# Patient Record
Sex: FEMALE | NOT HISPANIC OR LATINO | URBAN - METROPOLITAN AREA
[De-identification: names, ages, dates, MRNs, and addresses within clinical notes are randomized per-mention and may not be internally consistent; named-entity substitution may affect disease eponyms.]

---

## 2017-07-14 ENCOUNTER — EMERGENCY (EMERGENCY)
Facility: HOSPITAL | Age: 37
LOS: 1 days | End: 2017-07-14
Payer: COMMERCIAL

## 2017-07-14 PROCEDURE — 74177 CT ABD & PELVIS W/CONTRAST: CPT | Mod: 26

## 2017-07-14 PROCEDURE — 76705 ECHO EXAM OF ABDOMEN: CPT | Mod: 26

## 2017-07-14 PROCEDURE — 99285 EMERGENCY DEPT VISIT HI MDM: CPT

## 2017-07-16 ENCOUNTER — TRANSCRIPTION ENCOUNTER (OUTPATIENT)
Age: 37
End: 2017-07-16

## 2017-10-17 ENCOUNTER — OUTPATIENT (OUTPATIENT)
Dept: OUTPATIENT SERVICES | Facility: HOSPITAL | Age: 37
LOS: 1 days | End: 2017-10-17

## 2017-10-24 ENCOUNTER — OUTPATIENT (OUTPATIENT)
Dept: OUTPATIENT SERVICES | Facility: HOSPITAL | Age: 37
LOS: 1 days | End: 2017-10-24
Payer: COMMERCIAL

## 2017-10-24 PROCEDURE — 74220 X-RAY XM ESOPHAGUS 1CNTRST: CPT | Mod: 26

## 2018-05-02 ENCOUNTER — OUTPATIENT (OUTPATIENT)
Dept: OUTPATIENT SERVICES | Facility: HOSPITAL | Age: 38
LOS: 1 days | End: 2018-05-02

## 2018-05-08 ENCOUNTER — INPATIENT (INPATIENT)
Facility: HOSPITAL | Age: 38
LOS: 1 days | Discharge: HOME | End: 2018-05-10
Payer: COMMERCIAL

## 2018-05-09 PROCEDURE — 74220 X-RAY XM ESOPHAGUS 1CNTRST: CPT | Mod: 26

## 2020-03-16 ENCOUNTER — APPOINTMENT (OUTPATIENT)
Dept: RADIOLOGY | Facility: CLINIC | Age: 40
End: 2020-03-16
Payer: COMMERCIAL

## 2020-03-16 PROCEDURE — 73502 X-RAY EXAM HIP UNI 2-3 VIEWS: CPT | Mod: LT

## 2020-03-16 PROCEDURE — 72074 X-RAY EXAM THORAC SPINE4/>VW: CPT

## 2020-03-16 PROCEDURE — 72050 X-RAY EXAM NECK SPINE 4/5VWS: CPT

## 2020-03-16 PROCEDURE — 72110 X-RAY EXAM L-2 SPINE 4/>VWS: CPT

## 2020-03-19 ENCOUNTER — APPOINTMENT (OUTPATIENT)
Dept: MRI IMAGING | Facility: CLINIC | Age: 40
End: 2020-03-19
Payer: COMMERCIAL

## 2020-03-19 PROCEDURE — 72148 MRI LUMBAR SPINE W/O DYE: CPT

## 2020-03-23 PROBLEM — Z00.00 ENCOUNTER FOR PREVENTIVE HEALTH EXAMINATION: Status: ACTIVE | Noted: 2020-03-23

## 2020-06-23 ENCOUNTER — APPOINTMENT (OUTPATIENT)
Dept: NEUROSURGERY | Facility: CLINIC | Age: 40
End: 2020-06-23
Payer: COMMERCIAL

## 2020-06-23 VITALS
SYSTOLIC BLOOD PRESSURE: 139 MMHG | HEIGHT: 68.5 IN | BODY MASS INDEX: 43.45 KG/M2 | DIASTOLIC BLOOD PRESSURE: 88 MMHG | HEART RATE: 87 BPM | WEIGHT: 290 LBS

## 2020-06-23 DIAGNOSIS — Z78.9 OTHER SPECIFIED HEALTH STATUS: ICD-10-CM

## 2020-06-23 DIAGNOSIS — M51.37 OTHER INTERVERTEBRAL DISC DEGENERATION, LUMBOSACRAL REGION: ICD-10-CM

## 2020-06-23 DIAGNOSIS — Z82.49 FAMILY HISTORY OF ISCHEMIC HEART DISEASE AND OTHER DISEASES OF THE CIRCULATORY SYSTEM: ICD-10-CM

## 2020-06-23 PROCEDURE — 99204 OFFICE O/P NEW MOD 45 MIN: CPT

## 2020-06-23 RX ORDER — OMEPRAZOLE 20 MG/1
20 CAPSULE, DELAYED RELEASE ORAL
Refills: 0 | Status: ACTIVE | COMMUNITY

## 2020-06-23 NOTE — PLAN
[FreeTextEntry1] : \par DIAGNOSIS:  DISK DEGENERATION  \par TREATMENT PLAN:  NON-SURGICAL\par \par This is a patient with a degenerated lumbar and thoracic  disk. In reference to this thoracic subluxation. This is also simply this degeneration with a mild malalignment. It's likely due to her body habitus. She's got enhance kyphosis and this is secondarily related to her chest size.  We discussed this in detail and she does understand. This really no room for any surgical intervention.  I have recommended nonsurgical management at this time.  This consists of physical therapy and/or manual medicine in conjunction to medical therapy and other conservative methods.  These include the consideration of trigger point injections and or the utilization of modalities such as TENS where applicable.  The next tier would be the referral to a pain management specialist (anesthesia or physiatry) for the consideration of spinal injections.  This includes the options of epidural steroids, facet injections as well as other novel techniques that may provide pain relief.  Although there is indeed evidence of disk degeneration on imaging, discectomy or spinal fusion for the sole purposes of treating back/leg pain in the absence of a compressive lesion or neurological issue is associated with poor outcomes.   \par \par I have reviewed the images in detail with the patient today in my office and have answered all questions regarding this condition to the best of my ability to the patient’s satisfaction.\par

## 2020-06-23 NOTE — REASON FOR VISIT
[New Patient Visit] : a new patient visit [Referred By: _________] : Patient was referred by YULIA [FreeTextEntry1] : Subluxation thoracic spine- mri done at [facility].
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
## 2020-06-23 NOTE — PHYSICAL EXAM
[Normal] : normal [Able to toe walk] : the patient was able to toe walk [Able to heel walk] : the patient was able to heel walk [Intact] : all sensory within normal limits bilaterally

## 2020-06-23 NOTE — RESULTS/DATA
[FreeTextEntry1] : \par [facility] MRI reveals mild degeneration of the discs without any stenosis or herniated discs of significance. Cervical spine MRI done at [facility] shows no evidence of significant disc herniation or stenosis. Thoracic MRI done at [facility] shows enhanced kyphosis in the mid thoracic level. There is note of T8-T9 subluxation however it's basically a area of this degeneration with advanced arthrosis rather than a suggestion of any instability.  Flexion extension x-rays in the office confirmed that there is no instability.

## 2020-12-08 ENCOUNTER — OUTPATIENT (OUTPATIENT)
Dept: OUTPATIENT SERVICES | Facility: HOSPITAL | Age: 40
LOS: 1 days | End: 2020-12-08

## 2020-12-08 ENCOUNTER — INPATIENT (INPATIENT)
Facility: HOSPITAL | Age: 40
LOS: 6 days | Discharge: HOME | End: 2020-12-15
Payer: COMMERCIAL

## 2020-12-08 PROCEDURE — 93010 ELECTROCARDIOGRAM REPORT: CPT

## 2020-12-08 PROCEDURE — 71045 X-RAY EXAM CHEST 1 VIEW: CPT | Mod: 26,76

## 2020-12-08 PROCEDURE — 99284 EMERGENCY DEPT VISIT MOD MDM: CPT

## 2020-12-08 PROCEDURE — 32555 ASPIRATE PLEURA W/ IMAGING: CPT | Mod: LT

## 2020-12-08 PROCEDURE — 71275 CT ANGIOGRAPHY CHEST: CPT | Mod: 26

## 2020-12-08 PROCEDURE — 74177 CT ABD & PELVIS W/CONTRAST: CPT | Mod: 26

## 2020-12-09 ENCOUNTER — OUTPATIENT (OUTPATIENT)
Dept: OUTPATIENT SERVICES | Facility: HOSPITAL | Age: 40
LOS: 1 days | End: 2020-12-09

## 2020-12-09 PROCEDURE — 71045 X-RAY EXAM CHEST 1 VIEW: CPT | Mod: 26

## 2020-12-09 PROCEDURE — 71250 CT THORAX DX C-: CPT | Mod: 26

## 2020-12-10 ENCOUNTER — OUTPATIENT (OUTPATIENT)
Dept: OUTPATIENT SERVICES | Facility: HOSPITAL | Age: 40
LOS: 1 days | End: 2020-12-10

## 2020-12-10 ENCOUNTER — RESULT REVIEW (OUTPATIENT)
Age: 40
End: 2020-12-10

## 2020-12-10 PROCEDURE — 71045 X-RAY EXAM CHEST 1 VIEW: CPT | Mod: 26

## 2020-12-11 ENCOUNTER — OUTPATIENT (OUTPATIENT)
Dept: OUTPATIENT SERVICES | Facility: HOSPITAL | Age: 40
LOS: 1 days | End: 2020-12-11

## 2020-12-11 PROCEDURE — 93010 ELECTROCARDIOGRAM REPORT: CPT

## 2020-12-12 ENCOUNTER — APPOINTMENT (OUTPATIENT)
Dept: THORACIC SURGERY | Facility: HOSPITAL | Age: 40
End: 2020-12-12

## 2020-12-12 PROCEDURE — 32652 THORACOSCOPY REM TOTL CORTEX: CPT

## 2020-12-12 PROCEDURE — 71045 X-RAY EXAM CHEST 1 VIEW: CPT | Mod: 26

## 2020-12-13 ENCOUNTER — OUTPATIENT (OUTPATIENT)
Dept: OUTPATIENT SERVICES | Facility: HOSPITAL | Age: 40
LOS: 1 days | End: 2020-12-13

## 2020-12-13 PROCEDURE — 71046 X-RAY EXAM CHEST 2 VIEWS: CPT | Mod: 26

## 2020-12-14 ENCOUNTER — OUTPATIENT (OUTPATIENT)
Dept: OUTPATIENT SERVICES | Facility: HOSPITAL | Age: 40
LOS: 1 days | End: 2020-12-14

## 2020-12-14 PROCEDURE — 71045 X-RAY EXAM CHEST 1 VIEW: CPT | Mod: 26

## 2020-12-15 ENCOUNTER — OUTPATIENT (OUTPATIENT)
Dept: OUTPATIENT SERVICES | Facility: HOSPITAL | Age: 40
LOS: 1 days | End: 2020-12-15

## 2020-12-15 PROCEDURE — 71045 X-RAY EXAM CHEST 1 VIEW: CPT | Mod: 26

## 2020-12-28 ENCOUNTER — APPOINTMENT (OUTPATIENT)
Dept: THORACIC SURGERY | Facility: CLINIC | Age: 40
End: 2020-12-28
Payer: COMMERCIAL

## 2020-12-28 VITALS
DIASTOLIC BLOOD PRESSURE: 96 MMHG | SYSTOLIC BLOOD PRESSURE: 143 MMHG | TEMPERATURE: 97 F | WEIGHT: 293 LBS | HEIGHT: 68.5 IN | HEART RATE: 98 BPM | BODY MASS INDEX: 43.9 KG/M2 | OXYGEN SATURATION: 98 %

## 2020-12-28 PROCEDURE — 99024 POSTOP FOLLOW-UP VISIT: CPT

## 2021-01-21 ENCOUNTER — APPOINTMENT (OUTPATIENT)
Dept: THORACIC SURGERY | Facility: CLINIC | Age: 41
End: 2021-01-21

## 2021-01-25 ENCOUNTER — APPOINTMENT (OUTPATIENT)
Dept: ORTHOPEDIC SURGERY | Facility: CLINIC | Age: 41
End: 2021-01-25

## 2021-01-28 ENCOUNTER — APPOINTMENT (OUTPATIENT)
Dept: THORACIC SURGERY | Facility: CLINIC | Age: 41
End: 2021-01-28

## 2021-01-28 ENCOUNTER — APPOINTMENT (OUTPATIENT)
Dept: THORACIC SURGERY | Facility: CLINIC | Age: 41
End: 2021-01-28
Payer: COMMERCIAL

## 2021-01-28 ENCOUNTER — TRANSCRIPTION ENCOUNTER (OUTPATIENT)
Age: 41
End: 2021-01-28

## 2021-01-28 DIAGNOSIS — J86.9 PYOTHORAX W/OUT FISTULA: ICD-10-CM

## 2021-01-28 PROCEDURE — 99446 NTRPROF PH1/NTRNET/EHR 5-10: CPT

## 2021-01-28 RX ORDER — BUPRENORPHINE HYDROCHLORIDE, NALOXONE HYDROCHLORIDE 8; 2 MG/1; MG/1
8-2 FILM, SOLUBLE BUCCAL; SUBLINGUAL
Refills: 0 | Status: ACTIVE | COMMUNITY

## 2021-01-28 RX ORDER — OXYCODONE HYDROCHLORIDE 5 MG/1
5 CAPSULE ORAL
Refills: 0 | Status: COMPLETED | COMMUNITY
End: 2021-01-28

## 2021-01-29 NOTE — ASSESSMENT
[FreeTextEntry1] :  reports feeling better after antibiotic treatment. At this time there is no surgical interventions warranted. She will return to care as needed. \par \par PLAN: \par -Return to care as needed. \par \par \par \par \par Francisca WARD and Luis Toscano NP am scribing for and in the presence of Dr. Mauricio    the following sections HISTORY OF PRESENT ILLNESS, PAST MEDICAL/FAMILY/SOCIAL HISTORY; REVIEW OF SYSTEMS; VITAL SIGNS; PHYSICAL EXAM; DISPOSITION.\par \par "I personally performed the services described in the documentation, reviewed the documentation recorded by the scribe in my presence and accurately and completely records my words and actions."\par \par

## 2021-01-29 NOTE — DATA REVIEWED
[FreeTextEntry1] : Pathology from [facility] on 12/12/20\par - Left Lung Pleura \par Reactive fibroblasts and fibrin in a background of necroinflamatory debris and abundant neutrophils. Negative for malignancy.\par \par Chest X Ray from 12/30/20\par - Subtle airspace opacity in the left lower lobe, possibly consolidation or consoliation or interstitial edema in conjunction with a small left effusion.

## 2021-03-16 ENCOUNTER — APPOINTMENT (OUTPATIENT)
Dept: CT IMAGING | Facility: CLINIC | Age: 41
End: 2021-03-16

## 2021-04-01 ENCOUNTER — APPOINTMENT (OUTPATIENT)
Dept: CT IMAGING | Facility: CLINIC | Age: 41
End: 2021-04-01

## 2021-04-22 ENCOUNTER — APPOINTMENT (OUTPATIENT)
Dept: ORTHOPEDIC SURGERY | Facility: CLINIC | Age: 41
End: 2021-04-22
Payer: COMMERCIAL

## 2021-04-22 VITALS — HEIGHT: 68 IN | TEMPERATURE: 98.5 F | WEIGHT: 293 LBS | BODY MASS INDEX: 44.41 KG/M2

## 2021-04-22 PROCEDURE — 99204 OFFICE O/P NEW MOD 45 MIN: CPT

## 2021-04-22 PROCEDURE — 99072 ADDL SUPL MATRL&STAF TM PHE: CPT

## 2021-04-22 NOTE — PHYSICAL EXAM
[de-identified] : -Constitutional: This is a female in not obvious distress.\par -Psych: Patient is alert and oriented to person, place and time. Patient has a normal mood and affect.\par -Cardiovascular: Normal pulses throughout the upper extremities. No significant varicosities are noted in the upper extremities.\par -Neuro: Strength and sensation are intact throughout the upper extremities. Patient has normal coordination.\par -Respiratory: Patient exhibits no evidence of shortness of breath or difficulty breathing.\par -Skin: No rashes, lesions, or other abnormalities are noted in the upper extremities.\par \par ---\par \par Examination of both hands demonstrates no obvious thenar or intrinsic atrophy.  She has normal sensation to light touch along the radial, ulnar and median nerve distributions.  Provocative signs for cubital tunnel syndrome are positive on the right side and negative on the left.  There is no instability of the ulnar nerves at the cubital tunnel.  Provocative signs for carpal tunnel syndrome are positive bilaterally.

## 2021-04-22 NOTE — CONSULT LETTER
[Dear  ___] : Dear  [unfilled], [Consult Letter:] : I had the pleasure of evaluating your patient, [unfilled]. [Please see my note below.] : Please see my note below. [Consult Closing:] : Thank you very much for allowing me to participate in the care of this patient.  If you have any questions, please do not hesitate to contact me. [Sincerely,] : Sincerely, [FreeTextEntry3] : Virgilio Chou M.D.\par Surgery of the Hand and Upper Extremity\par Orthopaedic Surgery\par Chief, Hand Service, [facility]\par Director, Hand Service, [facility]\par  of Orthopedic Surgery, [facility] [facility] [employer] [facility] [employer] [facility] at [facility]\par [facility] CenterEmail: [email or web address]\par Office Phone: [phone]
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
## 2021-04-22 NOTE — ADDENDUM
[FreeTextEntry1] : This note was written by Bhumi Mac on 04/22/2021 acting solely as a scribe for Dr. Virgilio Chou.\par \par All medical record entries made by the scribe were at my, Dr. Virgilio Chou, direction and personally dictated by me on 04/22/2021. I have personally reviewed the chart and agree that the record accurately reflects my personal performance of the history, physical exam, assessment, and plan

## 2021-05-04 ENCOUNTER — TRANSCRIPTION ENCOUNTER (OUTPATIENT)
Age: 41
End: 2021-05-04

## 2021-07-01 ENCOUNTER — APPOINTMENT (OUTPATIENT)
Dept: ORTHOPEDIC SURGERY | Facility: CLINIC | Age: 41
End: 2021-07-01
Payer: COMMERCIAL

## 2021-07-01 VITALS — TEMPERATURE: 97.9 F | HEIGHT: 68 IN | WEIGHT: 293 LBS | BODY MASS INDEX: 44.41 KG/M2

## 2021-07-01 PROCEDURE — 99214 OFFICE O/P EST MOD 30 MIN: CPT | Mod: 25

## 2021-07-01 PROCEDURE — 99072 ADDL SUPL MATRL&STAF TM PHE: CPT

## 2021-07-01 PROCEDURE — 20526 THER INJECTION CARP TUNNEL: CPT | Mod: LT

## 2021-07-01 NOTE — PROCEDURE
[FreeTextEntry1] : -  After a discussion of risks and benefits, the patient agreed to proceed with a cortisone injection.  \par -  Side injected: Left carpal tunnel.\par -  Medications injected:1 cc of 1% Lidocaine and 1 cc of Celestone Soluspan 6mg/cc, using sterile technique.\par -  Patient tolerated the procedure well, without complications.\par -  Instructions: The patient was was instructed on the use of a carpal tunnel splint, especially at night.\par -  Follow-up: Within 2-3 weeks to assess response to the injection.

## 2021-07-01 NOTE — DISCUSSION/SUMMARY
[FreeTextEntry1] : I had a discussion regarding today's visit, the diagnosis and treatment recommendations and options.  We also discussed changes since the last visit.  At this time, she agreed to proceed with a cortisone injection on the left side. She will follow up in 3 weeks to assess her symptoms and for a possible right carpal tunnel injection.  She understands that if the injections do not provide with long-term relief, then I would not recommend multiple repeat injections in the future.\par \par The patient has agreed to the above plan of management and has expressed full understanding.  All questions were fully answered to the patient's satisfaction.\par \par My cumulative time spent on today's visit was greater than 30 minutes and included: Preparation for the visit, review of the medical records, review of pertinent diagnostic studies, examination and counseling of the patient on the above diagnosis, treatment plan and prognosis, orders of diagnostic tests, medications and/or appropriate procedures and documentation in the medical records of today's visit.

## 2021-07-01 NOTE — HISTORY OF PRESENT ILLNESS
[FreeTextEntry1] : Follow-up regarding right carpal tunnel syndrome.  See note from when she was seen in the office greater than 2 months ago.  We discussed a cortisone injection.\par \par She returns today for an injection. She has nighttime numbness and tingling. She feels her left hand is worse than her right.  She is particular symptomatic at night.  She has not tried bracing. \par \par EMGs from 10/8/2020 demonstrated mild right carpal tunnel syndrome.  There were no other abnormal findings noted.

## 2021-07-01 NOTE — PHYSICAL EXAM
[de-identified] : -Constitutional: This is a female in not obvious distress.\par -Psych: Patient is alert and oriented to person, place and time. Patient has a normal mood and affect.\par -Cardiovascular: Normal pulses throughout the upper extremities. No significant varicosities are noted in the upper extremities.\par -Neuro: Strength and sensation are intact throughout the upper extremities. Patient has normal coordination.\par -Respiratory: Patient exhibits no evidence of shortness of breath or difficulty breathing.\par -Skin: No rashes, lesions, or other abnormalities are noted in the upper extremities.\par \par ---\par \par Examination of both hands demonstrates no obvious thenar or intrinsic atrophy.  She has normal sensation to light touch along the radial, ulnar and median nerve distributions.  Provocative signs for carpal tunnel syndrome are positive bilaterally.

## 2021-07-21 ENCOUNTER — NON-APPOINTMENT (OUTPATIENT)
Age: 41
End: 2021-07-21

## 2021-07-29 ENCOUNTER — APPOINTMENT (OUTPATIENT)
Dept: ORTHOPEDIC SURGERY | Facility: CLINIC | Age: 41
End: 2021-07-29
Payer: COMMERCIAL

## 2021-07-30 ENCOUNTER — NON-APPOINTMENT (OUTPATIENT)
Age: 41
End: 2021-07-30

## 2021-08-09 ENCOUNTER — APPOINTMENT (OUTPATIENT)
Dept: ORTHOPEDIC SURGERY | Facility: CLINIC | Age: 41
End: 2021-08-09
Payer: COMMERCIAL

## 2021-08-09 VITALS — HEIGHT: 68 IN | WEIGHT: 293 LBS | TEMPERATURE: 97.8 F | BODY MASS INDEX: 44.41 KG/M2

## 2021-08-09 PROCEDURE — 99072 ADDL SUPL MATRL&STAF TM PHE: CPT

## 2021-08-09 PROCEDURE — 99214 OFFICE O/P EST MOD 30 MIN: CPT | Mod: 25

## 2021-08-09 PROCEDURE — 20526 THER INJECTION CARP TUNNEL: CPT | Mod: RT

## 2021-09-02 PROBLEM — G56.01 CARPAL TUNNEL SYNDROME OF RIGHT WRIST: Status: ACTIVE | Noted: 2021-04-22

## 2021-09-02 PROBLEM — G56.02 CARPAL TUNNEL SYNDROME OF LEFT WRIST: Status: ACTIVE | Noted: 2021-07-01

## 2021-09-09 ENCOUNTER — APPOINTMENT (OUTPATIENT)
Dept: ORTHOPEDIC SURGERY | Facility: CLINIC | Age: 41
End: 2021-09-09
Payer: COMMERCIAL

## 2021-09-09 VITALS — WEIGHT: 293 LBS | HEIGHT: 68 IN | TEMPERATURE: 97.3 F | BODY MASS INDEX: 44.41 KG/M2

## 2021-09-09 DIAGNOSIS — G56.02 CARPAL TUNNEL SYNDROME, LEFT UPPER LIMB: ICD-10-CM

## 2021-09-09 DIAGNOSIS — G56.01 CARPAL TUNNEL SYNDROME, RIGHT UPPER LIMB: ICD-10-CM

## 2021-09-09 PROCEDURE — 99072 ADDL SUPL MATRL&STAF TM PHE: CPT

## 2021-09-09 PROCEDURE — 99213 OFFICE O/P EST LOW 20 MIN: CPT

## 2021-09-09 NOTE — PHYSICAL EXAM
[de-identified] : -Constitutional: This is a female in not obvious distress.\par -Psych: Patient is alert and oriented to person, place and time. Patient has a normal mood and affect.\par -Cardiovascular: Normal pulses throughout the upper extremities. No significant varicosities are noted in the upper extremities.\par -Neuro: Strength and sensation are intact throughout the upper extremities. Patient has normal coordination.\par -Respiratory: Patient exhibits no evidence of shortness of breath or difficulty breathing.\par -Skin: No rashes, lesions, or other abnormalities are noted in the upper extremities.\par \par ---\par \par Examination of both hands demonstrates no obvious thenar or intrinsic atrophy.  She has normal sensation to light touch along the radial, ulnar and median nerve distributions.  Provocative signs for carpal tunnel syndrome are positive bilaterally, more notable on the right side.

## 2021-09-09 NOTE — PHYSICAL EXAM
[de-identified] : -Constitutional: This is a female in not obvious distress.\par -Psych: Patient is alert and oriented to person, place and time. Patient has a normal mood and affect.\par -Cardiovascular: Normal pulses throughout the upper extremities. No significant varicosities are noted in the upper extremities.\par -Neuro: Strength and sensation are intact throughout the upper extremities. Patient has normal coordination.\par -Respiratory: Patient exhibits no evidence of shortness of breath or difficulty breathing.\par -Skin: No rashes, lesions, or other abnormalities are noted in the upper extremities.\par \par ---\par \par Examination of both hands demonstrates no obvious thenar or intrinsic atrophy.  She has normal sensation to light touch along the radial, ulnar and median nerve distributions.  Provocative signs for carpal tunnel syndrome are mildly bilaterally.

## 2021-09-09 NOTE — HISTORY OF PRESENT ILLNESS
[FreeTextEntry1] : 1 month status post right carpal tunnel cortisone injection #1.  She was given a cortisone injection on the left side greater than 2 months ago.\par \par She is doing well overall is much improved since last visit.\par \par EMGs from 10/8/2020 demonstrated mild right carpal tunnel syndrome.  There were no other abnormal findings noted.

## 2021-09-09 NOTE — PROCEDURE
[FreeTextEntry1] : -  After a discussion of risks and benefits, the patient agreed to proceed with a cortisone injection.  \par -  Side injected: Right carpal tunnel.\par -  Medications injected:1 cc of 1% Lidocaine and 1 cc of Kenalog, 40 mg/cc, using sterile technique.\par -  Patient tolerated the procedure well, without complications.\par -  Instructions: The patient was was instructed on the use of a carpal tunnel splint, especially at night.\par -  Follow-up: Within 4 weeks to assess response to the injection.

## 2021-09-09 NOTE — DISCUSSION/SUMMARY
[FreeTextEntry1] : I had a discussion regarding today's visit, the diagnosis and treatment recommendations and options.  We also discussed changes since the last visit.  At this time, she would like to try a cortisone injection at the right carpal tunnel.  Again, as previously discussed with her, this may not provide her with long-term relief.\par \par The patient has agreed to the above plan of management and has expressed full understanding.  All questions were fully answered to the patient's satisfaction.\par \par My cumulative time spent on today's visit included: Preparation for the visit, review of the medical records, review of pertinent diagnostic studies, examination and counseling of the patient on the above diagnosis, treatment plan and prognosis, orders of diagnostic tests, medications and/or appropriate procedures and documentation in the medical records of today's visit.

## 2021-09-09 NOTE — DISCUSSION/SUMMARY
[FreeTextEntry1] : I had a discussion regarding today's visit, the diagnosis and treatment recommendations and options.  We also discussed changes since the last visit.  At this time, as she is much improved, I would recommend continued observation of her symptoms.  I recommended bilateral carpal tunnel splints at night.  She will follow-up on an as-needed basis.\par \par The patient has agreed to the above plan of management and has expressed full understanding.  All questions were fully answered to the patient's satisfaction.\par \par My cumulative time spent on today's visit was greater than 30 minutes and included: Preparation for the visit, review of the medical records, review of pertinent diagnostic studies, examination and counseling of the patient on the above diagnosis, treatment plan and prognosis, orders of diagnostic tests, medications and/or appropriate procedures and documentation in the medical records of today's visit.

## 2021-09-09 NOTE — HISTORY OF PRESENT ILLNESS
[FreeTextEntry1] : 39 days status post left carpal tunnel cortisone injection #1.\par \par She is 30% improved on the left side.  She is now more bothered by the right side.  She has some residual numbness and tingling on the left side but is improved.\par \par EMGs from 10/8/2020 demonstrated mild right carpal tunnel syndrome.  There were no other abnormal findings noted.

## 2021-10-31 ENCOUNTER — EMERGENCY (EMERGENCY)
Facility: HOSPITAL | Age: 41
LOS: 1 days | End: 2021-10-31
Admitting: EMERGENCY MEDICINE
Payer: COMMERCIAL

## 2021-10-31 PROCEDURE — 71045 X-RAY EXAM CHEST 1 VIEW: CPT | Mod: 26

## 2021-10-31 PROCEDURE — 99284 EMERGENCY DEPT VISIT MOD MDM: CPT

## 2022-02-06 ENCOUNTER — NON-APPOINTMENT (OUTPATIENT)
Age: 42
End: 2022-02-06

## 2022-02-18 ENCOUNTER — APPOINTMENT (OUTPATIENT)
Dept: ELECTROPHYSIOLOGY | Facility: CLINIC | Age: 42
End: 2022-02-18
Payer: COMMERCIAL

## 2022-02-18 VITALS
HEART RATE: 83 BPM | WEIGHT: 293 LBS | OXYGEN SATURATION: 96 % | SYSTOLIC BLOOD PRESSURE: 130 MMHG | TEMPERATURE: 97 F | BODY MASS INDEX: 44.41 KG/M2 | DIASTOLIC BLOOD PRESSURE: 88 MMHG | HEIGHT: 68 IN

## 2022-02-18 DIAGNOSIS — Z82.49 FAMILY HISTORY OF ISCHEMIC HEART DISEASE AND OTHER DISEASES OF THE CIRCULATORY SYSTEM: ICD-10-CM

## 2022-02-18 DIAGNOSIS — G47.33 OBSTRUCTIVE SLEEP APNEA (ADULT) (PEDIATRIC): ICD-10-CM

## 2022-02-18 DIAGNOSIS — F12.90 CANNABIS USE, UNSPECIFIED, UNCOMPLICATED: ICD-10-CM

## 2022-02-18 DIAGNOSIS — F17.200 NICOTINE DEPENDENCE, UNSPECIFIED, UNCOMPLICATED: ICD-10-CM

## 2022-02-18 DIAGNOSIS — Z78.9 OTHER SPECIFIED HEALTH STATUS: ICD-10-CM

## 2022-02-18 DIAGNOSIS — R00.0 TACHYCARDIA, UNSPECIFIED: ICD-10-CM

## 2022-02-18 DIAGNOSIS — Z83.438 FAMILY HISTORY OF OTHER DISORDER OF LIPOPROTEIN METABOLISM AND OTHER LIPIDEMIA: ICD-10-CM

## 2022-02-18 PROCEDURE — 93000 ELECTROCARDIOGRAM COMPLETE: CPT

## 2022-02-18 PROCEDURE — 99204 OFFICE O/P NEW MOD 45 MIN: CPT

## 2022-02-18 RX ORDER — OMEPRAZOLE 40 MG/1
40 CAPSULE, DELAYED RELEASE ORAL
Qty: 30 | Refills: 0 | Status: DISCONTINUED | COMMUNITY
Start: 2021-07-28 | End: 2022-02-18

## 2022-02-21 ENCOUNTER — NON-APPOINTMENT (OUTPATIENT)
Age: 42
End: 2022-02-21

## 2022-02-21 NOTE — HISTORY OF PRESENT ILLNESS
[FreeTextEntry1] : Patient is a 41-year-old woman who was seen in evaluation for palpitations.\par \par Palpitations history: Her palpitations has been over the last 5 years.  It seems to be worse during periods of her menstrual cycle.  The symptoms has been on and off for periods of weeks.   They Sometimes Feels like a Rapid Irregular Beat Sensation in Chest with Skipped Beats.  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 \par Of note the patient had just minor symptoms previously usually related to her menstrual cycle.  She feels after she got COVID vaccine her symptoms increased significantly. \par \par Prior gastric sleeve. \par \par She Had a Sleep Study Performed on 9/23/2021 That Showed That Her Oxygen Desaturation Index Was 8.5 and Lowest Saturation Was 77.  Her AHI Episodes Were 8.5.  She Was Considered to Have Mild Obstructive Sleep Apnea and Recommendations Today Was to Consider Possibly Mandibular or Oral Appliance.\par \par There Is a Questionable History of Asthma/Asthmatic Bronchitis.  Of Note She Was Given Beta-Blocker for Palpitations and Did Not Tolerate Well and Was Discontinued.  Question of Increased Wheezing on the Beta-Blocker.\par \par Patient was admitted December 2020 with left-sided chest pain and was noted to have a left-sided empyema -she underwent flexible bronchoscopy and then had a left VATS procedure done with total decortication.  \par \par She also had COVID in December 2021 but seem to be uncomplicated.\par \par She had a CT angiogram of the coronary arteries 4/23/2021 that showed total calcium score of 8.  She had a right dominant coronary circulation she had mild atherosclerotic calcification in the proximal left circumflex artery but the other arteries appeared normal.\par \par EKG from February 2, 2020 showed sinus rhythm with normal intervals and axis no evidence of preexcitation.\par \par Echocardiogram from 9/21/2021 showed left atrial diameter 4.1 cm, left atrial volume index 24 cc/m², EF 60%\par \par The patient had a monitor placed between 8/25/2021 and 9/8/2021 for the indication of palpitations.:  The baseline rhythm was sinus rhythm her average heart rate was about 84 bpm.  She had periods of sinus tachycardia at 107 bpm.  She did not have any significant bradycardia or pauses.  She had supraventricular ectopic beats 2.5% PVCs 1.0%.  She had rare bigeminy, trigeminy couplets.\par \par She had a Holter monitor placed 8/25/2021 to 9/8/2021: The baseline rhythm was sinus with an average heart rate of 84 bpm.  The maximum heart rate was 107 bpm and she had an SVE burden of 2.5%, PVC burden of 1.0%.\par

## 2022-02-21 NOTE — DISCUSSION/SUMMARY
[FreeTextEntry1] : Patient is a 41-year-old woman who has had about a 5-year history of palpitations and monitoring has documented sinus tachycardia, PVC as well as APCs.  She has had high stress, anxiety as well as panic and this seems to have played a role.  She also has increased BMI and question of sleep apnea.  No prior history of thyroid disease.  She has had prior history of asthma and did not tolerate beta-blocker well.  She claims her symptoms got worse today after the Covid vaccine.  Her echo shows preserved left ventricular function with normal atrial size.  Coronary CT did not show any obstructive disease.  \par \par Her symptoms have now improved on current medications and will get a follow-up monitor to reassess.  Question of whether her symptoms are more related to the premature beats either atrial or ventricular versus sinus tach.  We will try to make a correlation and decide whether she would benefit from ablative therapy if related to the PVCs.\par \par Based on the analysis of this monitor then will decide on whether to get stress testing and or cardiac MRI to assess for myocarditis.\par \par We also discussed lifestyle modification which for her would mean weight loss, exercise, increase fluid intake. \par Plan:  follow-up monitor \par \par

## 2022-02-21 NOTE — REVIEW OF SYSTEMS
[Weight Gain (___ Lbs)] : [unfilled] ~Ulb weight gain [Palpitations] : palpitations [Anxiety] : anxiety [Under Stress] : under stress [Feeling Fatigued] : not feeling fatigued [Blurry Vision] : no blurred vision [Sore Throat] : no sore throat [SOB] : no shortness of breath [Syncope] : no syncope [Cough] : no cough [Abdominal Pain] : no abdominal pain [Dizziness] : no dizziness

## 2022-02-21 NOTE — PHYSICAL EXAM
[No Acute Distress] : no acute distress [Obese] : obese [Normal Conjunctiva] : normal conjunctiva [Normal Venous Pressure] : normal venous pressure [Normal S1, S2] : normal S1, S2 [No Murmur] : no murmur [No Rub] : no rub [Clear Lung Fields] : clear lung fields [Non Tender] : non-tender [Normal Gait] : normal gait [No Edema] : no edema [No Rash] : no rash [No Focal Deficits] : no focal deficits [Alert and Oriented] : alert and oriented

## 2022-04-29 ENCOUNTER — APPOINTMENT (OUTPATIENT)
Dept: MRI IMAGING | Facility: CLINIC | Age: 42
End: 2022-04-29
Payer: COMMERCIAL

## 2022-04-29 PROCEDURE — 73721 MRI JNT OF LWR EXTRE W/O DYE: CPT | Mod: LT

## 2022-05-20 ENCOUNTER — APPOINTMENT (OUTPATIENT)
Dept: CARDIOLOGY | Facility: CLINIC | Age: 42
End: 2022-05-20
Payer: COMMERCIAL

## 2022-05-20 VITALS
WEIGHT: 293 LBS | DIASTOLIC BLOOD PRESSURE: 86 MMHG | SYSTOLIC BLOOD PRESSURE: 128 MMHG | HEIGHT: 68 IN | BODY MASS INDEX: 44.41 KG/M2 | HEART RATE: 97 BPM | TEMPERATURE: 97.3 F | OXYGEN SATURATION: 99 %

## 2022-05-20 DIAGNOSIS — E66.01 MORBID (SEVERE) OBESITY DUE TO EXCESS CALORIES: ICD-10-CM

## 2022-05-20 PROCEDURE — 93000 ELECTROCARDIOGRAM COMPLETE: CPT

## 2022-05-20 PROCEDURE — 99204 OFFICE O/P NEW MOD 45 MIN: CPT

## 2022-05-20 RX ORDER — FLUOXETINE HYDROCHLORIDE 10 MG/1
10 CAPSULE ORAL DAILY
Refills: 0 | Status: DISCONTINUED | COMMUNITY
End: 2022-05-20

## 2022-05-20 NOTE — PHYSICAL EXAM
[Well Developed] : well developed [Well Nourished] : well nourished [No Acute Distress] : no acute distress [Obese] : obese [No Carotid Bruit] : no carotid bruit [Normal S1, S2] : normal S1, S2 [No Murmur] : no murmur [No Rub] : no rub [No Gallop] : no gallop [Clear Lung Fields] : clear lung fields [Good Air Entry] : good air entry [No Respiratory Distress] : no respiratory distress  [Normal Gait] : normal gait [No Edema] : no edema [No Cyanosis] : no cyanosis [No Clubbing] : no clubbing [Normal Radial B/L] : normal radial B/L [Normal PT B/L] : normal PT B/L [Normal DP B/L] : normal DP B/L [Moves all extremities] : moves all extremities [Normal Speech] : normal speech [Alert and Oriented] : alert and oriented

## 2022-05-20 NOTE — CARDIOLOGY SUMMARY
[de-identified] : May 20, 2022.  Normal sinus rhythm [de-identified] : February 18, 2022.  Normal sinus rhythm.  PSVT longest 30 minutes.  Fastest 200+ beats per minute.  Average overall 176 bpm.  Asymptomatic during the time she had the monitor on. [de-identified] : September 21, 2021.  EF 60% trace mitral regurgitation [de-identified] : Coronary calcium score.  2021 score of 8.  LCx.  Coronary CTA.  Nonobstructive with mild proximal LCx calcification.  Right dominant.

## 2022-05-20 NOTE — DISCUSSION/SUMMARY
[FreeTextEntry1] : 42-year-old female with above medical history and active medical problems as noted below.\par #1 PSVT.  If there is symptomatic recurrence of PSVT recommend to contact us immediately so that appropriate further medication in the form of possible calcium channel blocker or if there are significant runs then to consider PSVT ablation.\par Lower caffeine alcohol intake and increasing activity exercise and stress management.\par 2.  Coronary calcification/atherosclerosis.  Mild.  None of the last considering her younger age group, family history of premature coronary artery disease I would be more aggressive.\par Follow-up lipid panel ordered.\par LDL goal should be less than 70.  As needed statin therapy should be prescribed.\par At present decided against aspirin specifically in absence of significant atherosclerosis, significant gastroesophageal reflux disease symptoms with gastric sleeve procedure.\par Continue aggressive lifestyle and risk factor modification reviewed.\par 3.  History of sleep apnea.  Evaluation management with your office.\par #4 morbid obesity.  She is stable at present from cardiac point of view for any bariatric surgery.\par \par Counseling regarding low saturated fat, salt and carbohydrate intake was reviewed. Active lifestyle and regular. Exercise along with weight management is advised.\par All the above were at length reviewed. Answered all the questions. Thank you very much for this kind referral. Please do not hesitate to give me a call for any question.\par Part of this transcription was done with voice recognition software and phonetically similar errors are common. I apologize for that. Please do not hesitate to call for any questions due to above.\par \par Sincerely,\par Martin Suazo MD,FACC,RAMEZ\par \par

## 2022-05-20 NOTE — REASON FOR VISIT
[Symptom and Test Evaluation] : symptom and test evaluation [Arrhythmia/ECG Abnorrmalities] : arrhythmia/ECG abnormalities [Coronary Artery Disease] : coronary artery disease [FreeTextEntry3] : Dr. Cornejo [FreeTextEntry1] : 42-year-old white female is referred to me for consultation in presence of\par 1.  Intermittent history of palpitations.  Associated with PSVT for more than 5 years.  Metoprolol used earlier gave her bronchospasm.  Was placed on diltiazem and tolerated it well and was discontinued.  Since she had COVID-vaccine her symptoms increased with continued till in April since then he has no further complaint of palpitations.\par 2.  Obesity.  History of gastric sleeve procedure.  Now contemplating gastric bypass surgery\par 3.  History of mild sleep apnea.  Evaluation was done for mandibular or oral appliance.\par 4.  CT angiogram of coronary arteries 2021 calcium score of 8 right dominant coronary circulation with mild atherosclerotic calcification of the proximal LCx.\par #5 family history of premature coronary artery disease.  First cousin  suddenly in his 30s.  Mother with atherosclerotic vascular disease at a younger age.\par 6.  History of left-sided empyema status post decortication.\par \par She has no significant palpitation at present.  Stable exertional dyspnea.  No significant chest pain.  There is no PND orthopnea.  No dizziness near syncopal or syncopal event.\par She is very good with her dietary restrictions.  And try to be active.\par Unable to lose weight in spite of gastric sleeve procedure.\par

## 2022-07-20 ENCOUNTER — APPOINTMENT (OUTPATIENT)
Dept: ELECTROPHYSIOLOGY | Facility: CLINIC | Age: 42
End: 2022-07-20

## 2022-08-24 ENCOUNTER — OUTPATIENT (OUTPATIENT)
Dept: OUTPATIENT SERVICES | Facility: HOSPITAL | Age: 42
LOS: 1 days | End: 2022-08-24

## 2022-08-24 PROCEDURE — 93010 ELECTROCARDIOGRAM REPORT: CPT

## 2022-08-24 NOTE — DISCUSSION/SUMMARY
[FreeTextEntry1] : She has findings consistent with right carpal tunnel syndrome with EMGs demonstrating mild right carpal tunnel syndrome.  She also has symptoms on the left side but there was no evidence of a compression neuropathy on EMGs in the left side.\par \par I had a discussion regarding today's visit, the diagnosis, and treatment recommendations / options.  I had to call her because she had to leave before the EMGs were available for my review.  I called her back I recommended initially try a cortisone injection on the right side.  She agreed with this plan.  She will make a follow-up appointment for an injection.\par \par The patient has agreed to this plan of management and has expressed full understanding.  All questions were fully answered to the patient's satisfaction.\par \par My time spent on this visit included: Preparation for the visit, review of the medical records, review of pertinent diagnostic studies, examination and counseling of the patient on the above diagnosis, treatment plan and prognosis, orders of diagnostic tests, medication and/or appropriate procedures and documentation in the medical records of today's visit.  show

## 2022-08-30 ENCOUNTER — INPATIENT (INPATIENT)
Facility: HOSPITAL | Age: 42
LOS: 0 days | Discharge: HOME | End: 2022-08-31

## 2022-08-30 PROCEDURE — 88304 TISSUE EXAM BY PATHOLOGIST: CPT | Mod: 26

## 2022-08-31 PROCEDURE — 74220 X-RAY XM ESOPHAGUS 1CNTRST: CPT | Mod: 26

## 2022-09-05 DIAGNOSIS — E66.01 MORBID (SEVERE) OBESITY DUE TO EXCESS CALORIES: ICD-10-CM

## 2022-09-05 DIAGNOSIS — D64.9 ANEMIA, UNSPECIFIED: ICD-10-CM

## 2022-09-05 DIAGNOSIS — K21.9 GASTRO-ESOPHAGEAL REFLUX DISEASE WITHOUT ESOPHAGITIS: ICD-10-CM

## 2022-09-05 DIAGNOSIS — F90.9 ATTENTION-DEFICIT HYPERACTIVITY DISORDER, UNSPECIFIED TYPE: ICD-10-CM

## 2022-09-05 DIAGNOSIS — Z79.84 LONG TERM (CURRENT) USE OF ORAL HYPOGLYCEMIC DRUGS: ICD-10-CM

## 2022-09-05 DIAGNOSIS — K66.0 PERITONEAL ADHESIONS (POSTPROCEDURAL) (POSTINFECTION): ICD-10-CM

## 2022-09-05 DIAGNOSIS — Z01.812 ENCOUNTER FOR PREPROCEDURAL LABORATORY EXAMINATION: ICD-10-CM

## 2022-09-05 DIAGNOSIS — Z01.810 ENCOUNTER FOR PREPROCEDURAL CARDIOVASCULAR EXAMINATION: ICD-10-CM

## 2022-09-05 DIAGNOSIS — E11.9 TYPE 2 DIABETES MELLITUS WITHOUT COMPLICATIONS: ICD-10-CM

## 2022-09-11 ENCOUNTER — OUTPATIENT (OUTPATIENT)
Dept: OUTPATIENT SERVICES | Facility: HOSPITAL | Age: 42
LOS: 1 days | End: 2022-09-11

## 2022-09-11 ENCOUNTER — INPATIENT (INPATIENT)
Facility: HOSPITAL | Age: 42
LOS: 2 days | Discharge: HOME | End: 2022-09-14
Attending: OBSTETRICS & GYNECOLOGY | Admitting: OBSTETRICS & GYNECOLOGY

## 2022-09-11 PROCEDURE — 76830 TRANSVAGINAL US NON-OB: CPT | Mod: 26

## 2022-09-11 PROCEDURE — 76856 US EXAM PELVIC COMPLETE: CPT | Mod: 26

## 2022-09-11 PROCEDURE — G1004: CPT

## 2022-09-11 PROCEDURE — 99285 EMERGENCY DEPT VISIT HI MDM: CPT

## 2022-09-11 PROCEDURE — 74177 CT ABD & PELVIS W/CONTRAST: CPT | Mod: 26,ME

## 2022-09-12 ENCOUNTER — OUTPATIENT (OUTPATIENT)
Dept: OUTPATIENT SERVICES | Facility: HOSPITAL | Age: 42
LOS: 1 days | End: 2022-09-12

## 2022-09-12 PROCEDURE — 99222 1ST HOSP IP/OBS MODERATE 55: CPT

## 2022-09-13 ENCOUNTER — OUTPATIENT (OUTPATIENT)
Dept: OUTPATIENT SERVICES | Facility: HOSPITAL | Age: 42
LOS: 1 days | End: 2022-09-13

## 2022-09-13 PROCEDURE — 74177 CT ABD & PELVIS W/CONTRAST: CPT | Mod: 26

## 2022-09-20 DIAGNOSIS — K21.9 GASTRO-ESOPHAGEAL REFLUX DISEASE WITHOUT ESOPHAGITIS: ICD-10-CM

## 2022-09-20 DIAGNOSIS — F11.20 OPIOID DEPENDENCE, UNCOMPLICATED: ICD-10-CM

## 2022-09-20 DIAGNOSIS — N83.201 UNSPECIFIED OVARIAN CYST, RIGHT SIDE: ICD-10-CM

## 2022-09-20 DIAGNOSIS — N70.93 SALPINGITIS AND OOPHORITIS, UNSPECIFIED: ICD-10-CM

## 2022-09-20 DIAGNOSIS — F90.1 ATTENTION-DEFICIT HYPERACTIVITY DISORDER, PREDOMINANTLY HYPERACTIVE TYPE: ICD-10-CM

## 2022-09-20 DIAGNOSIS — Z20.822 CONTACT WITH AND (SUSPECTED) EXPOSURE TO COVID-19: ICD-10-CM

## 2022-09-20 DIAGNOSIS — E11.9 TYPE 2 DIABETES MELLITUS WITHOUT COMPLICATIONS: ICD-10-CM

## 2022-09-20 DIAGNOSIS — Z98.84 BARIATRIC SURGERY STATUS: ICD-10-CM

## 2022-09-20 DIAGNOSIS — E56.1 DEFICIENCY OF VITAMIN K: ICD-10-CM

## 2022-09-20 DIAGNOSIS — Z97.5 PRESENCE OF (INTRAUTERINE) CONTRACEPTIVE DEVICE: ICD-10-CM

## 2022-09-20 DIAGNOSIS — E66.01 MORBID (SEVERE) OBESITY DUE TO EXCESS CALORIES: ICD-10-CM

## 2022-09-20 DIAGNOSIS — Z79.84 LONG TERM (CURRENT) USE OF ORAL HYPOGLYCEMIC DRUGS: ICD-10-CM

## 2022-09-20 DIAGNOSIS — F32.A DEPRESSION, UNSPECIFIED: ICD-10-CM

## 2022-09-20 DIAGNOSIS — Z79.899 OTHER LONG TERM (CURRENT) DRUG THERAPY: ICD-10-CM

## 2022-09-20 DIAGNOSIS — K59.00 CONSTIPATION, UNSPECIFIED: ICD-10-CM

## 2022-09-20 DIAGNOSIS — Z87.891 PERSONAL HISTORY OF NICOTINE DEPENDENCE: ICD-10-CM

## 2022-09-23 ENCOUNTER — OUTPATIENT (OUTPATIENT)
Dept: OUTPATIENT SERVICES | Facility: HOSPITAL | Age: 42
LOS: 1 days | End: 2022-09-23

## 2022-09-23 DIAGNOSIS — R79.1 ABNORMAL COAGULATION PROFILE: ICD-10-CM

## 2022-09-28 ENCOUNTER — APPOINTMENT (OUTPATIENT)
Age: 42
End: 2022-09-28

## 2022-10-11 DIAGNOSIS — D72.829 ELEVATED WHITE BLOOD CELL COUNT, UNSPECIFIED: ICD-10-CM

## 2022-10-11 DIAGNOSIS — R10.0 ACUTE ABDOMEN: ICD-10-CM

## 2022-10-11 DIAGNOSIS — E66.01 MORBID (SEVERE) OBESITY DUE TO EXCESS CALORIES: ICD-10-CM

## 2022-10-11 DIAGNOSIS — E11.69 TYPE 2 DIABETES MELLITUS WITH OTHER SPECIFIED COMPLICATION: ICD-10-CM

## 2022-10-28 DIAGNOSIS — D72.829 ELEVATED WHITE BLOOD CELL COUNT, UNSPECIFIED: ICD-10-CM

## 2022-10-28 DIAGNOSIS — E66.01 MORBID (SEVERE) OBESITY DUE TO EXCESS CALORIES: ICD-10-CM

## 2022-10-28 DIAGNOSIS — R10.0 ACUTE ABDOMEN: ICD-10-CM

## 2022-10-28 DIAGNOSIS — E11.69 TYPE 2 DIABETES MELLITUS WITH OTHER SPECIFIED COMPLICATION: ICD-10-CM

## 2023-02-13 ENCOUNTER — OUTPATIENT (OUTPATIENT)
Dept: OUTPATIENT SERVICES | Facility: HOSPITAL | Age: 43
LOS: 1 days | End: 2023-02-13
Payer: COMMERCIAL

## 2023-02-13 DIAGNOSIS — R79.1 ABNORMAL COAGULATION PROFILE: ICD-10-CM

## 2023-02-16 ENCOUNTER — APPOINTMENT (OUTPATIENT)
Age: 43
End: 2023-02-16
Payer: COMMERCIAL

## 2023-02-16 ENCOUNTER — RESULT REVIEW (OUTPATIENT)
Age: 43
End: 2023-02-16

## 2023-02-16 ENCOUNTER — APPOINTMENT (OUTPATIENT)
Age: 43
End: 2023-02-16

## 2023-02-16 DIAGNOSIS — R79.1 ABNORMAL COAGULATION PROFILE: ICD-10-CM

## 2023-02-16 LAB
BASOPHILS # BLD AUTO: 0.04 K/UL — SIGNIFICANT CHANGE UP (ref 0–0.2)
BASOPHILS NFR BLD AUTO: 0.4 % — SIGNIFICANT CHANGE UP (ref 0–2)
EOSINOPHIL # BLD AUTO: 0.15 K/UL — SIGNIFICANT CHANGE UP (ref 0–0.5)
EOSINOPHIL NFR BLD AUTO: 1.5 % — SIGNIFICANT CHANGE UP (ref 0–6)
HCT VFR BLD CALC: 36.1 % — SIGNIFICANT CHANGE UP (ref 34.5–45)
HGB BLD-MCNC: 11.2 G/DL — LOW (ref 11.5–15.5)
IMM GRANULOCYTES NFR BLD AUTO: 0.4 % — SIGNIFICANT CHANGE UP (ref 0–0.9)
LYMPHOCYTES # BLD AUTO: 2.31 K/UL — SIGNIFICANT CHANGE UP (ref 1–3.3)
LYMPHOCYTES # BLD AUTO: 23.1 % — SIGNIFICANT CHANGE UP (ref 13–44)
MCHC RBC-ENTMCNC: 24.5 PG — LOW (ref 27–34)
MCHC RBC-ENTMCNC: 31 GM/DL — LOW (ref 32–36)
MCV RBC AUTO: 78.8 FL — LOW (ref 80–100)
MONOCYTES # BLD AUTO: 0.63 K/UL — SIGNIFICANT CHANGE UP (ref 0–0.9)
MONOCYTES NFR BLD AUTO: 6.3 % — SIGNIFICANT CHANGE UP (ref 2–14)
NEUTROPHILS # BLD AUTO: 6.82 K/UL — SIGNIFICANT CHANGE UP (ref 1.8–7.4)
NEUTROPHILS NFR BLD AUTO: 68.3 % — SIGNIFICANT CHANGE UP (ref 43–77)
NRBC # BLD: 0 /100 WBCS — SIGNIFICANT CHANGE UP (ref 0–0)
PLATELET # BLD AUTO: 321 K/UL — SIGNIFICANT CHANGE UP (ref 150–400)
RBC # BLD: 4.58 M/UL — SIGNIFICANT CHANGE UP (ref 3.8–5.2)
RBC # FLD: 14.6 % — HIGH (ref 10.3–14.5)
WBC # BLD: 9.99 K/UL — SIGNIFICANT CHANGE UP (ref 3.8–10.5)
WBC # FLD AUTO: 9.99 K/UL — SIGNIFICANT CHANGE UP (ref 3.8–10.5)

## 2023-02-16 PROCEDURE — 99215 OFFICE O/P EST HI 40 MIN: CPT

## 2023-02-23 LAB
ALBUMIN SERPL ELPH-MCNC: 3.8 G/DL
ALP BLD-CCNC: 121 U/L
ALT SERPL-CCNC: 15 U/L
AST SERPL-CCNC: 18 U/L
BILIRUB DIRECT SERPL-MCNC: 0.1 MG/DL
BILIRUB INDIRECT SERPL-MCNC: 0.1 MG/DL
BILIRUB SERPL-MCNC: 0.2 MG/DL
PROT SERPL-MCNC: 6.8 G/DL

## 2023-02-24 ENCOUNTER — NON-APPOINTMENT (OUTPATIENT)
Age: 43
End: 2023-02-24

## 2023-02-24 LAB
APTT BLD: 37 SEC
FACT VII ACT/NOR PPP: 96 %
GGT SERPL-CCNC: 11 U/L
INR PPP: 1.13 RATIO
PT BLD: 13.2 SEC

## 2023-02-28 ENCOUNTER — APPOINTMENT (OUTPATIENT)
Age: 43
End: 2023-02-28

## 2023-02-28 DIAGNOSIS — E61.1 IRON DEFICIENCY: ICD-10-CM

## 2023-03-01 LAB
APTT 2H P INC PPP: NORMAL
APTT IMM NP/PRE NP PPP: NORMAL
APTT INV RATIO PPP: 32.8 SEC
NPP NORMAL POOLED PLASMA: NORMAL SECS

## 2023-03-07 ENCOUNTER — APPOINTMENT (OUTPATIENT)
Age: 43
End: 2023-03-07

## 2023-03-08 ENCOUNTER — APPOINTMENT (OUTPATIENT)
Age: 43
End: 2023-03-08

## 2023-03-08 PROCEDURE — 85027 COMPLETE CBC AUTOMATED: CPT

## 2023-03-08 PROCEDURE — 96365 THER/PROPH/DIAG IV INF INIT: CPT

## 2023-03-08 PROCEDURE — 96374 THER/PROPH/DIAG INJ IV PUSH: CPT

## 2023-03-24 PROBLEM — R79.1 PROLONGED PTT: Status: ACTIVE | Noted: 2023-02-28

## 2023-03-28 ENCOUNTER — APPOINTMENT (OUTPATIENT)
Age: 43
End: 2023-03-28

## 2023-04-14 ENCOUNTER — OUTPATIENT (OUTPATIENT)
Dept: OUTPATIENT SERVICES | Facility: HOSPITAL | Age: 43
LOS: 1 days | End: 2023-04-14
Payer: COMMERCIAL

## 2023-04-14 DIAGNOSIS — E61.1 IRON DEFICIENCY: ICD-10-CM

## 2023-04-15 NOTE — HISTORY OF PRESENT ILLNESS
[de-identified] : Had bariatric surgery with Dr. Hemphill in the summer of 2022. Prolonged PT in September when hospitalized for tubulo-ovarian abscess.  Factor VII levels were low.\par \par She was also anemic at a time with a hemoglobin of 9.9 g.  Normocytic.

## 2023-04-15 NOTE — REVIEW OF SYSTEMS
[Fatigue] : no fatigue [Recent Change In Weight] : ~T no recent weight change [Dysphagia] : no dysphagia [Odynophagia] : no odynophagia [Chest Pain] : no chest pain [Shortness Of Breath] : no shortness of breath [Abdominal Pain] : no abdominal pain [Joint Pain] : no joint pain [Joint Stiffness] : no joint stiffness

## 2023-04-19 ENCOUNTER — APPOINTMENT (OUTPATIENT)
Age: 43
End: 2023-04-19

## 2023-04-20 ENCOUNTER — RESULT REVIEW (OUTPATIENT)
Age: 43
End: 2023-04-20

## 2023-04-20 ENCOUNTER — APPOINTMENT (OUTPATIENT)
Age: 43
End: 2023-04-20
Payer: COMMERCIAL

## 2023-04-20 VITALS
HEIGHT: 68 IN | TEMPERATURE: 98 F | WEIGHT: 229 LBS | SYSTOLIC BLOOD PRESSURE: 123 MMHG | HEART RATE: 87 BPM | OXYGEN SATURATION: 97 % | DIASTOLIC BLOOD PRESSURE: 88 MMHG | BODY MASS INDEX: 34.71 KG/M2

## 2023-04-20 DIAGNOSIS — R79.1 ABNORMAL COAGULATION PROFILE: ICD-10-CM

## 2023-04-20 DIAGNOSIS — D50.9 IRON DEFICIENCY ANEMIA, UNSPECIFIED: ICD-10-CM

## 2023-04-20 LAB
BASOPHILS # BLD AUTO: 0.04 K/UL — SIGNIFICANT CHANGE UP (ref 0–0.2)
BASOPHILS NFR BLD AUTO: 0.5 % — SIGNIFICANT CHANGE UP (ref 0–2)
EOSINOPHIL # BLD AUTO: 0.13 K/UL — SIGNIFICANT CHANGE UP (ref 0–0.5)
EOSINOPHIL NFR BLD AUTO: 1.6 % — SIGNIFICANT CHANGE UP (ref 0–6)
HCT VFR BLD CALC: 41.6 % — SIGNIFICANT CHANGE UP (ref 34.5–45)
HGB BLD-MCNC: 13.2 G/DL — SIGNIFICANT CHANGE UP (ref 11.5–15.5)
IMM GRANULOCYTES NFR BLD AUTO: 0.1 % — SIGNIFICANT CHANGE UP (ref 0–0.9)
LYMPHOCYTES # BLD AUTO: 2.17 K/UL — SIGNIFICANT CHANGE UP (ref 1–3.3)
LYMPHOCYTES # BLD AUTO: 26.9 % — SIGNIFICANT CHANGE UP (ref 13–44)
MCHC RBC-ENTMCNC: 27.3 PG — SIGNIFICANT CHANGE UP (ref 27–34)
MCHC RBC-ENTMCNC: 31.7 GM/DL — LOW (ref 32–36)
MCV RBC AUTO: 86.1 FL — SIGNIFICANT CHANGE UP (ref 80–100)
MONOCYTES # BLD AUTO: 0.65 K/UL — SIGNIFICANT CHANGE UP (ref 0–0.9)
MONOCYTES NFR BLD AUTO: 8.1 % — SIGNIFICANT CHANGE UP (ref 2–14)
NEUTROPHILS # BLD AUTO: 5.06 K/UL — SIGNIFICANT CHANGE UP (ref 1.8–7.4)
NEUTROPHILS NFR BLD AUTO: 62.8 % — SIGNIFICANT CHANGE UP (ref 43–77)
NRBC # BLD: 0 /100 WBCS — SIGNIFICANT CHANGE UP (ref 0–0)
PLATELET # BLD AUTO: 241 K/UL — SIGNIFICANT CHANGE UP (ref 150–400)
RBC # BLD: 4.83 M/UL — SIGNIFICANT CHANGE UP (ref 3.8–5.2)
RBC # FLD: 18.3 % — HIGH (ref 10.3–14.5)
WBC # BLD: 8.06 K/UL — SIGNIFICANT CHANGE UP (ref 3.8–10.5)
WBC # FLD AUTO: 8.06 K/UL — SIGNIFICANT CHANGE UP (ref 3.8–10.5)

## 2023-04-20 PROCEDURE — 85027 COMPLETE CBC AUTOMATED: CPT

## 2023-04-20 PROCEDURE — 99213 OFFICE O/P EST LOW 20 MIN: CPT

## 2023-04-21 LAB
FERRITIN SERPL-MCNC: 60 NG/ML
IRON SATN MFR SERPL: 19 %
IRON SERPL-MCNC: 57 UG/DL
TIBC SERPL-MCNC: 299 UG/DL
TRANSFERRIN SERPL-MCNC: 241 MG/DL
UIBC SERPL-MCNC: 242 UG/DL

## 2023-04-27 NOTE — RESULTS/DATA
[FreeTextEntry1] : Ms. Leonard is a pleasant 42 year-old woman here for evaluation of iron deficiency anemia, prolonged PT. Patient being seen as per physician's primary plan of care.\par

## 2023-04-27 NOTE — REVIEW OF SYSTEMS
[Recent Change In Weight] : ~T no recent weight change [Dysphagia] : no dysphagia [Odynophagia] : no odynophagia [Chest Pain] : no chest pain [Shortness Of Breath] : no shortness of breath [Wheezing] : no wheezing [Cough] : no cough [SOB on Exertion] : no shortness of breath during exertion [Abdominal Pain] : no abdominal pain [Joint Pain] : no joint pain [Joint Stiffness] : no joint stiffness [Dizziness] : no dizziness [Easy Bleeding] : no tendency for easy bleeding [Easy Bruising] : no tendency for easy bruising

## 2023-04-27 NOTE — HISTORY OF PRESENT ILLNESS
[de-identified] : Had bariatric surgery with Dr. Hemphill in the summer of 2022. Prolonged PT in September when hospitalized for tubulo-ovarian abscess.  Factor VII levels were low.\par \par She was also anemic at a time with a hemoglobin of 9.9 g.  Normocytic. [de-identified] : Gloria is feeling better after the IVferaheme. Still with some fatigue, but denies chest pain, palpitations, SOB or vertigo.

## 2023-05-30 ENCOUNTER — APPOINTMENT (OUTPATIENT)
Dept: CARDIOLOGY | Facility: CLINIC | Age: 43
End: 2023-05-30
Payer: COMMERCIAL

## 2023-06-16 NOTE — RESULTS/DATA
Transitional Care Management Telephone Call Attempt    Discharge Date: 5/11/23  Discharge Location: [facility]: [facility]    Call Attempt Date: 6/16/2023  Call Attempt: First   [FreeTextEntry1] : Ms. Leonard is a pleasant 42 year-old woman here for evaluation of iron deficiency anemia, prolonged PT.

## 2023-06-21 ENCOUNTER — APPOINTMENT (OUTPATIENT)
Dept: CARDIOLOGY | Facility: CLINIC | Age: 43
End: 2023-06-21
Payer: COMMERCIAL

## 2023-06-21 ENCOUNTER — NON-APPOINTMENT (OUTPATIENT)
Age: 43
End: 2023-06-21

## 2023-06-21 ENCOUNTER — APPOINTMENT (OUTPATIENT)
Age: 43
End: 2023-06-21

## 2023-06-21 VITALS
HEART RATE: 80 BPM | DIASTOLIC BLOOD PRESSURE: 66 MMHG | OXYGEN SATURATION: 99 % | BODY MASS INDEX: 33.04 KG/M2 | SYSTOLIC BLOOD PRESSURE: 114 MMHG | HEIGHT: 68 IN | WEIGHT: 218 LBS

## 2023-06-21 DIAGNOSIS — Z87.891 PERSONAL HISTORY OF NICOTINE DEPENDENCE: ICD-10-CM

## 2023-06-21 DIAGNOSIS — I25.10 ATHEROSCLEROTIC HEART DISEASE OF NATIVE CORONARY ARTERY W/OUT ANGINA PECTORIS: ICD-10-CM

## 2023-06-21 DIAGNOSIS — I47.1 SUPRAVENTRICULAR TACHYCARDIA: ICD-10-CM

## 2023-06-21 DIAGNOSIS — I49.3 VENTRICULAR PREMATURE DEPOLARIZATION: ICD-10-CM

## 2023-06-21 DIAGNOSIS — R00.2 PALPITATIONS: ICD-10-CM

## 2023-06-21 PROCEDURE — 93000 ELECTROCARDIOGRAM COMPLETE: CPT

## 2023-06-21 PROCEDURE — 99406 BEHAV CHNG SMOKING 3-10 MIN: CPT

## 2023-06-21 PROCEDURE — 99214 OFFICE O/P EST MOD 30 MIN: CPT | Mod: 25

## 2023-06-21 RX ORDER — METHYLPHENIDATE HYDROCHLORIDE 18 MG/1
18 TABLET, EXTENDED RELEASE ORAL DAILY
Refills: 0 | Status: ACTIVE | COMMUNITY

## 2023-06-21 NOTE — REASON FOR VISIT
[Symptom and Test Evaluation] : symptom and test evaluation [Arrhythmia/ECG Abnorrmalities] : arrhythmia/ECG abnormalities [Coronary Artery Disease] : coronary artery disease [FreeTextEntry3] : Dr. Cornejo [FreeTextEntry1] : 43-year-old white female is referred to me for consultation in presence of\par 1.  Intermittent history of palpitations.  Associated with PSVT for more than 5 years.  Metoprolol used earlier gave her bronchospasm.  Was placed on diltiazem and tolerated it well and was discontinued.  No further symptoms\par 2.  Obesity.  History of gastric sleeve procedure.  Now contemplating gastric bypass surgery\par 3.  History of mild sleep apnea.  Evaluation was done for mandibular or oral appliance.\par 4.  CT angiogram of coronary arteries 2021 calcium score of 8 right dominant coronary circulation with mild atherosclerotic calcification of the proximal LCx.  Not on statin therapy.\par #5 family history of premature coronary artery disease.  First cousin  suddenly in his 30s.  Mother with atherosclerotic vascular disease at a younger age.\par 6.  History of left-sided empyema status post decortication.\par 7.  Intermittent nicotine addiction with cigarettes.  At present she is been smoking.\par \par She has no significant palpitation at present.  Stable exertional dyspnea.  No significant chest pain.  There is no PND orthopnea.  No dizziness near syncopal or syncopal event.\par She is very good with her dietary restrictions.  And try to be active.\par Recent gastric sleeve adjustment.\par No recent labs.\par

## 2023-06-21 NOTE — CARDIOLOGY SUMMARY
[de-identified] : May 20, 2022.  Normal sinus rhythm [de-identified] : February 18, 2022.  Normal sinus rhythm.  PSVT longest 30 minutes.  Fastest 200+ beats per minute.  Average overall 176 bpm.  Asymptomatic during the time she had the monitor on. [de-identified] : September 21, 2021.  EF 60% trace mitral regurgitation [de-identified] : Coronary calcium score.  2021 score of 8.  LCx.  Coronary CTA.  Nonobstructive with mild proximal LCx calcification.  Right dominant.

## 2023-06-21 NOTE — DISCUSSION/SUMMARY
[FreeTextEntry1] : 43-year-old female with above medical history and active medical problems as noted below.\par #1 PSVT.  If there is symptomatic recurrence of PSVT recommend to contact us immediately so that appropriate further medication in the form of possible calcium channel blocker or if there are significant runs then to consider PSVT ablation.\par Lower caffeine alcohol intake and increasing activity exercise and stress management.\par 2.  Coronary calcification/atherosclerosis.  Mild. considering her younger age group, family history of premature coronary artery disease I would be more aggressive.\par Follow-up lipid panel ordered.\par LDL goal should be less than 70.  Statin therapy should be considered.\par At present decided against aspirin specifically in absence of significant atherosclerosis, significant gastroesophageal reflux disease symptoms with gastric sleeve procedure.\par Continue aggressive lifestyle and risk factor modification reviewed.\par 3.  History of sleep apnea.  Evaluation management with your office.\par #4 morbid obesity.  She is stable at present from cardiac point of view for any bariatric surgery.\par 5.  Nicotine addiction.  Smoking cessation discussed.\par #6.  Systolic murmur.  At the base.  Evaluate with echocardiogram.\par Counseling regarding low saturated fat, salt and carbohydrate intake was reviewed. Active lifestyle and regular. Exercise along with weight management is advised.\par All the above were at length reviewed. Answered all the questions. Thank you very much for this kind referral. Please do not hesitate to give me a call for any question.\par Part of this transcription was done with voice recognition software and phonetically similar errors are common. I apologize for that. Please do not hesitate to call for any questions due to above.\par \par Sincerely,\par Martin Suazo MD,FACC,RAMEZ\par \par

## 2023-06-21 NOTE — PHYSICAL EXAM
[Well Developed] : well developed [Well Nourished] : well nourished [No Acute Distress] : no acute distress [Obese] : obese [No Carotid Bruit] : no carotid bruit [Normal S1, S2] : normal S1, S2 [No Gallop] : no gallop [No Rub] : no rub [Clear Lung Fields] : clear lung fields [Good Air Entry] : good air entry [No Respiratory Distress] : no respiratory distress  [Normal Gait] : normal gait [No Edema] : no edema [No Cyanosis] : no cyanosis [No Clubbing] : no clubbing [Normal Radial B/L] : normal radial B/L [Normal DP B/L] : normal DP B/L [Moves all extremities] : moves all extremities [Normal Speech] : normal speech [Alert and Oriented] : alert and oriented [de-identified] : Midsystolic murmur at the base.  2/6.

## 2023-06-21 NOTE — ASSESSMENT
[FreeTextEntry1] : Reviewed on June 21, 2023.\par EKG as noted above\par I do not have recent labs
No

## 2023-06-21 NOTE — COUNSELING
[Cessation strategies including cessation program discussed] : Cessation strategies including cessation program discussed [Use of nicotine replacement therapies and other medications discussed] : Use of nicotine replacement therapies and other medications discussed [Encouraged to pick a quit date and identify support needed to quit] : Encouraged to pick a quit date and identify support needed to quit [Smoking Cessation Program Referral] : Smoking Cessation Program Referral  [Yes] : Willing to quit smoking [FreeTextEntry1] : 3

## 2023-06-22 ENCOUNTER — OUTPATIENT (OUTPATIENT)
Dept: OUTPATIENT SERVICES | Facility: HOSPITAL | Age: 43
LOS: 1 days | End: 2023-06-22

## 2023-06-22 DIAGNOSIS — E61.1 IRON DEFICIENCY: ICD-10-CM

## 2023-07-07 ENCOUNTER — APPOINTMENT (OUTPATIENT)
Age: 43
End: 2023-07-07

## 2023-07-14 ENCOUNTER — APPOINTMENT (OUTPATIENT)
Dept: CARDIOLOGY | Facility: CLINIC | Age: 43
End: 2023-07-14

## 2023-08-22 ENCOUNTER — APPOINTMENT (OUTPATIENT)
Dept: CARDIOLOGY | Facility: CLINIC | Age: 43
End: 2023-08-22

## 2023-09-04 NOTE — ASSESSMENT
[FreeTextEntry1] : Reviewed on May 20, 2022.\par EKG, prior echocardiogram, event monitor, coronary calcium score/coronary CTA were reviewed.
9

## 2023-11-20 ENCOUNTER — NON-APPOINTMENT (OUTPATIENT)
Age: 43
End: 2023-11-20

## 2024-07-12 ENCOUNTER — APPOINTMENT (OUTPATIENT)
Dept: CARDIOLOGY | Facility: CLINIC | Age: 44
End: 2024-07-12

## 2025-08-06 ENCOUNTER — TRANSCRIPTION ENCOUNTER (OUTPATIENT)
Age: 45
End: 2025-08-06